# Patient Record
Sex: MALE | Race: BLACK OR AFRICAN AMERICAN | Employment: UNEMPLOYED | URBAN - METROPOLITAN AREA
[De-identification: names, ages, dates, MRNs, and addresses within clinical notes are randomized per-mention and may not be internally consistent; named-entity substitution may affect disease eponyms.]

---

## 2018-12-25 ENCOUNTER — HOSPITAL ENCOUNTER (EMERGENCY)
Age: 63
Discharge: HOME | End: 2018-12-25
Attending: EMERGENCY MEDICINE
Payer: MEDICARE

## 2018-12-25 VITALS
RESPIRATION RATE: 16 BRPM | WEIGHT: 280 LBS | SYSTOLIC BLOOD PRESSURE: 166 MMHG | HEART RATE: 73 BPM | HEIGHT: 74 IN | TEMPERATURE: 99 F | OXYGEN SATURATION: 100 % | DIASTOLIC BLOOD PRESSURE: 79 MMHG | BODY MASS INDEX: 35.94 KG/M2
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
## 2018-12-25 PROCEDURE — 99283 EMERGENCY DEPT VISIT LOW MDM: CPT

## 2018-12-25 NOTE — DISCHARGE INSTRUCTIONS

## 2018-12-25 NOTE — ED PROVIDER NOTES
EMERGENCY DEPARTMENT HISTORY AND PHYSICAL EXAM    Date: 12/25/2018  Patient Name: Addis Gutiérrez    History of Presenting Illness     Chief Complaint   Patient presents with    Fall         History Provided By: Patient    Chief Complaint: check up  Duration: ~30 Minutes  Timing:  Acute  Location: generalized  Associated Symptoms: chronic back pain which is unchanged    Additional History (Context):     6:11 PM    Addis Gutiérrez is a 61 y.o. male with pertinent PMHx of stroke (baseline deficits on the left) and chronic pain presenting via EMS to the ED c/o for a generalized check up fall ~ half an hour ago. Pt states that his daughters were bringing him down the steps in his home when they \"lost control\" and he fell down 2 steps. They were transferring him back to the [facility], where he lives. Pt states that he did not hit anything on the way down. Pt denies any new pain and states that he did not hit his head. He notes chronic lower back pain, which is unchanged. Pt specifically denies any wounds, HA, or new pain. PCP: No primary care provider on file. Pt does not smoke tobacco, drink ETOH or do illicit drugs. There are no other complaints, changes, or physical findings at this time. Past History     Past Medical History:  Past Medical History:   Diagnosis Date    At risk for falls     Diabetes ([facility] [address]     Fall     Hypertension     Obese     Stroke [facility])        Past Surgical History:  No past surgical history on file. Family History:  No family history on file. Social History:  Social History     Tobacco Use    Smoking status: Not on file   Substance Use Topics    Alcohol use: Not on file    Drug use: Not on file       Allergies:  No Known Allergies      Review of Systems   Review of Systems   Constitutional:        Here for a check up   Musculoskeletal: Positive for back pain (baseline, lower back). Skin: Negative for wound.    Neurological: Negative for headaches. All other systems reviewed and are negative. Physical Exam     Vitals:    12/25/18 1806   BP: 170/83   Pulse: 71   Resp: 18   Temp: 98.8 °F (37.1 °C)   SpO2: 100%   Weight: 127 kg (280 lb)   Height: 6' 2\" (1.88 m)     Physical Exam   Constitutional: He is oriented to person, place, and time. He appears well-developed and well-nourished. Alert, sitting up on stretcher, NAD, oriented x4   HENT:   Head: Normocephalic and atraumatic. Neck: Normal range of motion. Neck supple. c spine non tender    Cardiovascular: Normal rate, regular rhythm, normal heart sounds and intact distal pulses. No murmur heard. Pulmonary/Chest: Effort normal and breath sounds normal. No respiratory distress. He has no wheezes. He has no rales. Abdominal: Soft. Bowel sounds are normal. There is no hepatosplenomegaly. There is no tenderness. There is no rigidity, no rebound, no guarding and no CVA tenderness. Musculoskeletal:   Back TTP diffusely but at baseline no new features, no crepitus or step off  Extremities non tender    Neurological: He is alert and oriented to person, place, and time. Skin: Skin is warm and dry. Psychiatric: He has a normal mood and affect. Judgment normal.   Nursing note and vitals reviewed. Diagnostic Study Results     Labs -   No results found for this or any previous visit (from the past 12 hour(s)). Radiologic Studies -   No orders to display         Medical Decision Making   I am the first provider for this patient. I reviewed the vital signs, available nursing notes, past medical history, past surgical history, family history and social history. Vital Signs-Reviewed the patient's vital signs. Pulse Oximetry Analysis - 100% on RA     Records Reviewed: Nursing Notes and Old Medical Records    PROCEDURES:  Procedures    MEDICATIONS GIVEN IN THE ED:  Medications - No data to display     ED COURSE:   6:11 PM   Initial assessment performed.     Diagnosis and Disposition     DISCUSSION: Pt presents after fall while going down a few stairs. He states he fell flat onto his back but did not hit his head. He is not complaining of any new pain, other than his normal back pain which is unchanged. Extremities non-TTP, no evidence of injury, no crepitus or step off on the back. Will discharge. DISCHARGE NOTE:  6:17 PM  The patient is ready for discharge. The patient's signs, symptoms, diagnosis, and discharge instructions have been discussed and the patient and/or family has conveyed their understanding. The patient and/or family is to follow up as recommended or return to the ER should their symptoms worsen. Plan has been discussed and the patient and/or family is in agreement. Written by Charlie Rene [address] [facility], ED Scribe, as dictated by Rose Ann PA-C.     CLINICAL IMPRESSION:  1. Fall, initial encounter        PLAN:  1. D/C Home  2. There are no discharge medications for this patient. 3.   Follow-up Information     Follow up With Specialties Details Why Jagruti  Schedule an appointment as soon as possible for a visit [address] EMERGENCY DEPT Emergency Medicine  As needed, If symptoms worsen [address] [place] [address]  [phone]        _______________________________    Attestations: This note is prepared by Holland Staton, acting as Scribe for Rose Ann PA-C. Rose Ann PA-C:  The scribe's documentation has been prepared under my direction and personally reviewed by me in its entirety.   I confirm that the note above accurately reflects all work, treatment, procedures, and medical decision making performed by me.  _______________________________

## 2018-12-25 NOTE — ED TRIAGE NOTES
Pt was being assisted by several family members out of his family's [facility], fell down [facility] steps. Pt uses a wheelchair. C/o back pain, denies hitting his head.

## 2018-12-25 NOTE — ED NOTES
Patient arrived via EMS. Patient disposition discharge. Patient wheelchair bound, requiring medical transport. Per Constanza, [facility] report \"within the hour and may re-evaluate patient status. \" Patient and family to be made aware

## 2018-12-26 NOTE — ED NOTES
Upon arrival to this shift, pt was scheduled for [facility] pending [facility] evaluation. [facility] arrived, and per [facility]'s supervisor pt was not an acceptable transfer for [facility]. This RN and unit secretary, Rosa Julien, in to pt's room to inform pt and family that [address] would not accept transfer. Pt's daughter increasingly angry that this RN and unit secretary could not let pt be transferred. We repeatedly explained that it was not this hospital that denying transfer it was [facility] denying transfer. Unit secretary, Rosa Julien, explained that if pt had medicaid this transport would be accepted. Pt's daughter then started screaming, \"He has medicaid. I told that little short girl, but nobody would listen to me. No one will listen to me and my concerns. \"     This RN explained that the previous nurse REBECCA Raya, did not inform that pt had medicaid. Pt's daughter began to scream again, \"I told her to call [facility], and get all the information. \"     Upon unit secretaryJaya's departure from the room, the pt's daughter began to speak negatively about unit secretary. Pt's daughter std, \"Don't let her come back in her. Her attitude is not good. She is a bitch. Don't bring her back in here. Her being here will not help the situation. \"     This RN explained that she would have charge nurse into speak w/ pt since the situation was so tense. Upon this RN's departure from room, pt's daughter loudly yelled for this RN \"to get your ass Fatoumata Primes this room. \"    Per unit secretary, Rosa Julien, the nurse from [facility] never received a phone call from any nurse on staff and [facility].      Pt was eventually discharged home via POV with the help of Harshal Fitch and Michelle Guzman (both [facility]